# Patient Record
Sex: MALE | Race: WHITE | NOT HISPANIC OR LATINO | Employment: FULL TIME | URBAN - NONMETROPOLITAN AREA
[De-identification: names, ages, dates, MRNs, and addresses within clinical notes are randomized per-mention and may not be internally consistent; named-entity substitution may affect disease eponyms.]

---

## 2017-03-16 ENCOUNTER — OFFICE VISIT (OUTPATIENT)
Dept: FAMILY MEDICINE CLINIC | Facility: CLINIC | Age: 47
End: 2017-03-16

## 2017-03-16 VITALS
HEART RATE: 77 BPM | BODY MASS INDEX: 26.74 KG/M2 | WEIGHT: 191 LBS | DIASTOLIC BLOOD PRESSURE: 80 MMHG | SYSTOLIC BLOOD PRESSURE: 132 MMHG | HEIGHT: 71 IN | OXYGEN SATURATION: 98 %

## 2017-03-16 DIAGNOSIS — M79.601 PAIN, ARM, RIGHT: ICD-10-CM

## 2017-03-16 DIAGNOSIS — J02.9 ACUTE PHARYNGITIS, UNSPECIFIED ETIOLOGY: Primary | ICD-10-CM

## 2017-03-16 PROBLEM — E78.1 HYPERTRIGLYCERIDEMIA: Status: ACTIVE | Noted: 2017-03-16

## 2017-03-16 PROBLEM — I87.2 CHRONIC VENOUS INSUFFICIENCY: Status: ACTIVE | Noted: 2017-03-16

## 2017-03-16 PROBLEM — E11.9 TYPE 2 DIABETES MELLITUS WITHOUT COMPLICATION: Status: ACTIVE | Noted: 2017-03-16

## 2017-03-16 LAB
EXPIRATION DATE: NORMAL
INTERNAL CONTROL: NORMAL
Lab: NORMAL
S PYO AG THROAT QL: NEGATIVE

## 2017-03-16 PROCEDURE — 99214 OFFICE O/P EST MOD 30 MIN: CPT | Performed by: FAMILY MEDICINE

## 2017-03-16 PROCEDURE — 87880 STREP A ASSAY W/OPTIC: CPT | Performed by: FAMILY MEDICINE

## 2017-03-16 NOTE — PROGRESS NOTES
Subjective   Solomon Aguilar is a 46 y.o. male.     History of Present Illness   46-year-old male.  Here for 2 problems.  First, sore throat.  Started this morning.  No fever.  No cough.  No cold or drainage.  Second, right upper extremity pain.  He points to the anterior surface of his forearm.  He really does not state that his fingers are numb.  He is right handed.  No definite trauma but he does a lot of lifting and his right hand is his dominant hand.  The following portions of the patient's history were reviewed and updated as appropriate: allergies, current medications, past family history, past medical history, past social history, past surgical history and problem list.    Review of Systems   Constitutional: Negative for activity change, chills and fever.   HENT: Positive for sore throat. Negative for congestion.    Respiratory: Negative for cough and wheezing.    Cardiovascular: Negative for chest pain.   Musculoskeletal: Positive for arthralgias. Negative for neck pain (right upper extremity pain) and neck stiffness.       Objective   Physical Exam   Constitutional: He appears well-developed and well-nourished.   HENT:   Right Ear: External ear normal.   Left Ear: External ear normal.   Mouth/Throat: Posterior oropharyngeal edema present.   Eyes: EOM are normal. Pupils are equal, round, and reactive to light.   Neck: Neck supple.   Cardiovascular: Normal rate, regular rhythm and normal heart sounds.    Pulmonary/Chest: Effort normal and breath sounds normal.   Vitals reviewed.      Assessment/Plan   Solomon was seen today for shoulder pain, sore throat, headache and hyperglycemia.    Diagnoses and all orders for this visit:    Acute pharyngitis, unspecified etiology  -     POC Rapid Strep A    Pain, arm, right     non-strep nasal pharyngitis.  Symptomatic care.  If he runs a fever and develops a cough and aching this evening, we'll do a flu test in the morning.    Pain in the right arm.  He'll wear a  carpal splint at night.  If this gets worse may have to consider testing for carpal tunnel.  No x-rays indicated yet.

## 2017-06-02 RX ORDER — OMEPRAZOLE 40 MG/1
40 CAPSULE, DELAYED RELEASE ORAL DAILY
Qty: 90 CAPSULE | Refills: 3 | Status: SHIPPED | OUTPATIENT
Start: 2017-06-02

## 2017-08-02 ENCOUNTER — OFFICE VISIT (OUTPATIENT)
Dept: FAMILY MEDICINE CLINIC | Facility: CLINIC | Age: 47
End: 2017-08-02

## 2017-08-02 ENCOUNTER — TELEPHONE (OUTPATIENT)
Dept: FAMILY MEDICINE CLINIC | Facility: CLINIC | Age: 47
End: 2017-08-02

## 2017-08-02 VITALS
WEIGHT: 184 LBS | OXYGEN SATURATION: 98 % | HEART RATE: 76 BPM | SYSTOLIC BLOOD PRESSURE: 130 MMHG | DIASTOLIC BLOOD PRESSURE: 80 MMHG | BODY MASS INDEX: 25.76 KG/M2 | HEIGHT: 71 IN

## 2017-08-02 DIAGNOSIS — R20.2 PARESTHESIA OF LEFT ARM AND LEG: ICD-10-CM

## 2017-08-02 DIAGNOSIS — E11.9 TYPE 2 DIABETES MELLITUS WITHOUT COMPLICATION, WITHOUT LONG-TERM CURRENT USE OF INSULIN (HCC): Primary | ICD-10-CM

## 2017-08-02 DIAGNOSIS — R07.89 ATYPICAL CHEST PAIN: ICD-10-CM

## 2017-08-02 DIAGNOSIS — R63.4 WEIGHT LOSS: ICD-10-CM

## 2017-08-02 PROCEDURE — 93000 ELECTROCARDIOGRAM COMPLETE: CPT | Performed by: FAMILY MEDICINE

## 2017-08-02 PROCEDURE — 99214 OFFICE O/P EST MOD 30 MIN: CPT | Performed by: FAMILY MEDICINE

## 2017-08-02 RX ORDER — GLIMEPIRIDE 2 MG/1
2 TABLET ORAL
Qty: 30 TABLET | Refills: 1 | Status: SHIPPED | OUTPATIENT
Start: 2017-08-02

## 2017-08-02 NOTE — PROGRESS NOTES
Subjective   Solomon Aguilar is a 47 y.o. male.     History of Present Illness   47-year-old male who came today because his blood sugars of been running high.  I have not seen him in 2 years for general medical care.  States his blood sugars of been up in the 3 400s or even higher.  Since January he has lost 15 pounds.  An A1c was checked here in the office and was 12.  The following portions of the patient's history were reviewed and updated as appropriate: allergies, current medications, past family history, past medical history, past social history, past surgical history and problem list.    Review of Systems   Constitutional: Positive for unexpected weight change. Negative for chills and fever.   HENT: Negative for congestion.    Eyes: Negative.    Cardiovascular: Positive for chest pain (nonspecific left anterior chest pain which last for a few seconds.  Works every day.  Not necessarily exertion related.) and leg swelling (mild.  Peripheral venous disease.).   Gastrointestinal: Negative.    Endocrine: Positive for polydipsia and polyuria.   Genitourinary: Positive for frequency. Negative for dysuria.   Neurological: Positive for numbness (some sort of paresthesia in the left arm and left leg.  This is been present some time.).       Objective   Physical Exam   Constitutional: He is oriented to person, place, and time. He appears well-nourished.   HENT:   Head: Normocephalic.   Right Ear: External ear normal.   Left Ear: External ear normal.   Mouth/Throat: Oropharynx is clear and moist.   Eyes: EOM are normal.   Neck: Neck supple.   Cardiovascular: Normal rate, regular rhythm, normal heart sounds and intact distal pulses.    No murmur heard.  No carotid or abdominal bruits   Pulmonary/Chest: Effort normal and breath sounds normal.   Abdominal: Soft. He exhibits no distension. There is no tenderness.   Musculoskeletal: Normal range of motion.   Neurological: He is alert and oriented to person, place, and  time. No cranial nerve deficit. He exhibits normal muscle tone. Coordination normal.   Skin: Skin is warm. No erythema.   Psychiatric: He has a normal mood and affect.       ECG 12 Lead  Date/Time: 8/2/2017 10:17 AM  Performed by: SAMM ORO  Authorized by: SAMM ORO   Comparison: not compared with previous ECG   Rhythm: sinus rhythm  Rate: normal  Conduction: conduction normal  ST Segments: ST segments normal  T Waves: T waves normal  QRS axis: normal  Other: no other findings  Clinical impression: normal ECG          Assessment/Plan   Solomon was seen today for blood sugar problem.    Diagnoses and all orders for this visit:    Type 2 diabetes mellitus without complication, without long-term current use of insulin  -     Hemoglobin A1c  -     Ambulatory Referral to Nutrition Services  -     metFORMIN (GLUCOPHAGE) 500 MG tablet; Take 1 tablet by mouth 2 (Two) Times a Day With Meals.  -     glimepiride (AMARYL) 2 MG tablet; Take 1 tablet by mouth Every Morning Before Breakfast.    Weight loss    Atypical chest pain    Paresthesia of left arm and leg    Other orders  -     ECG 12 Lead      Diabetes.  Sree came in for a simple visit but his diabetes is obviously out of control.  Has not been seen for any kind of general medical care for 2 years.  Still works every day.  He has weight loss, polydipsia.  A1c is 12.  I took the time to do a general exam.  Overall nothing particularly revealing.  Having some atypical chest pain but a normal EKG.  I can't say with confidence that this is angina.  Also some nonspecific complaints of unusual sensations in his left hand and left leg.  Normal sensation to monofilament.  No carotid bruits.  Other testing such as a carotid Doppler may be needed later.  Order written to get a comprehensive medical profile, CBC, lipid panel, TSH.  He has to get these through [employer].  Follow-up appointment given.  Also given a sheet to write down his blood sugars fasting and before his  evening meal, 3 times a week.  Appointment to meet with a nutritionist with his wife.  Get an eye exam.    Weight loss.  Due to his diabetes out of control.    Atypical chest pain.  Normal EKG.    Paresthesia in the left arm and left leg.  Exam unremarkable.

## 2017-08-17 ENCOUNTER — DOCUMENTATION (OUTPATIENT)
Dept: FAMILY MEDICINE CLINIC | Facility: CLINIC | Age: 47
End: 2017-08-17

## 2017-08-17 ENCOUNTER — TELEPHONE (OUTPATIENT)
Dept: FAMILY MEDICINE CLINIC | Facility: CLINIC | Age: 47
End: 2017-08-17

## 2017-08-17 RX ORDER — FENOFIBRATE 145 MG/1
145 TABLET, COATED ORAL DAILY
Qty: 30 TABLET | Refills: 5 | Status: SHIPPED | OUTPATIENT
Start: 2017-08-17 | End: 2022-04-26 | Stop reason: ALTCHOICE

## 2017-08-17 RX ORDER — ATORVASTATIN CALCIUM 80 MG/1
80 TABLET, FILM COATED ORAL DAILY
Qty: 30 TABLET | Refills: 5 | Status: SHIPPED | OUTPATIENT
Start: 2017-08-17

## 2017-08-17 NOTE — PROGRESS NOTES
Solomon came by the office today with his wife to go over his medicines.  I saw his both his daughters at this time so he doesn't have an actual office visit.  He was recently found to have his diabetes will out of control and was started on medicines.  Apparently since I saw him last he went to the emergency room at [facility] where apparently he had a very extensive workup, primarily for some nonspecific chest pain.  My concern today, is his triglycerides 931.  I do feel strongly that he needs to be on a statin as well as a fenofibrate.  When I saw him last, he mention some paresthesia in his left arm and left leg.  Nothing was done at [facility] and I am ordering a carotid Doppler.  He has been checking blood sugars and states that his readings have been considerably better, that is less than 200 and more times less than 150.

## 2017-08-17 NOTE — TELEPHONE ENCOUNTER
----- Message from Juanis Mendoza sent at 8/10/2017  2:38 PM EDT -----  PATIENT CALLED REGARDING RECENT LABS RESULTS. ALSO HAD CONCERNS ABOUT SUGAR RUNNING HIGH; OVER 300    PLEASE CALL PATIENT [phone]

## 2022-04-19 DIAGNOSIS — Z00.6 EXAMINATION FOR NORMAL COMPARISON OR CONTROL IN CLINICAL RESEARCH: Primary | ICD-10-CM

## 2022-04-26 ENCOUNTER — NURSE NAVIGATOR (OUTPATIENT)
Dept: ONCOLOGY | Facility: CLINIC | Age: 52
End: 2022-04-26

## 2022-04-26 ENCOUNTER — OFFICE VISIT (OUTPATIENT)
Dept: PULMONOLOGY | Facility: CLINIC | Age: 52
End: 2022-04-26

## 2022-04-26 VITALS
WEIGHT: 188.8 LBS | HEART RATE: 59 BPM | DIASTOLIC BLOOD PRESSURE: 80 MMHG | OXYGEN SATURATION: 99 % | HEIGHT: 71 IN | BODY MASS INDEX: 26.43 KG/M2 | SYSTOLIC BLOOD PRESSURE: 140 MMHG | TEMPERATURE: 98.4 F

## 2022-04-26 DIAGNOSIS — R91.1 LUNG NODULE: Primary | ICD-10-CM

## 2022-04-26 DIAGNOSIS — R91.8 LUNG NODULES: Primary | ICD-10-CM

## 2022-04-26 PROCEDURE — 99204 OFFICE O/P NEW MOD 45 MIN: CPT | Performed by: INTERNAL MEDICINE

## 2022-04-26 RX ORDER — KETOCONAZOLE 20 MG/ML
1 SHAMPOO TOPICAL 3 TIMES WEEKLY
COMMUNITY
Start: 2022-04-08

## 2022-04-26 RX ORDER — FENOFIBRATE 160 MG/1
160 TABLET ORAL DAILY
COMMUNITY

## 2022-04-26 RX ORDER — ASPIRIN 81 MG/1
81 TABLET ORAL DAILY
COMMUNITY

## 2022-04-26 RX ORDER — EMPAGLIFLOZIN 10 MG/1
1 TABLET, FILM COATED ORAL DAILY
COMMUNITY
Start: 2022-02-25

## 2022-04-26 RX ORDER — LOSARTAN POTASSIUM 100 MG/1
100 TABLET ORAL DAILY
COMMUNITY
Start: 2022-04-11

## 2022-04-26 NOTE — PROGRESS NOTES
CC:    Lung nodule    HPI:    50 y/o WM w/ h/o ~30 py smoking quit 2012, vaping [id number], HTN, HLD, T2DM, GERD, who had an episode of hemoptysis about a month ago.  He awoke from sleep and coughed up some fresh blood and blood clot x1.  Did not persist after this.  He quit vaping about 2 weeks ago.  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  No wheezing, chest tightness, chronic cough etc.  This led to CXR and CT scan.    PMH:    No past medical history on file.  PSH:    Past Surgical History:   Procedure Laterality Date   • HERNIA REPAIR       FH:    No family history on file.  SH:    Social History     Socioeconomic History   • Marital status:    Tobacco Use   • Smoking status: Former Smoker     Types: Electronic Cigarette   Substance and Sexual Activity   • Alcohol use: No     ALLERGIES:    No Known Allergies  MEDICATIONS:      Current Outpatient Medications:   •  aspirin 81 MG EC tablet, Take 81 mg by mouth Daily., Disp: , Rfl:   •  atorvastatin (LIPITOR) 80 MG tablet, Take 1 tablet by mouth Daily., Disp: 30 tablet, Rfl: 5  •  fenofibrate 160 MG tablet, Take 160 mg by mouth Daily., Disp: , Rfl:   •  fluocinonide (LIDEX) 0.05 % cream, Apply 1 application topically to the appropriate area as directed As Needed., Disp: , Rfl:   •  glimepiride (AMARYL) 2 MG tablet, Take 1 tablet by mouth Every Morning Before Breakfast. (Patient taking differently: Take 4 mg by mouth Every Morning Before Breakfast.), Disp: 30 tablet, Rfl: 1  •  Jardiance 10 MG tablet tablet, Take 1 tablet by mouth Daily., Disp: , Rfl:   •  ketoconazole (NIZORAL) 2 % shampoo, Apply 1 application topically to the appropriate area as directed 3 (Three) Times a Week., Disp: , Rfl:   •  losartan (COZAAR) 100 MG tablet, Take 100 mg by mouth Daily., Disp: , Rfl:   •  metFORMIN (GLUCOPHAGE) 1000 MG tablet, Take 1,000 mg by mouth 2 (Two) Times a Day., Disp: , Rfl:   •  omeprazole (priLOSEC) 40 MG capsule, Take 1 capsule by mouth Daily., Disp: 90  capsule, Rfl: 3  ROS:  Per HPI, otherwise all systems reviewed and negative.    DIAGNOSTIC DATA (Reviewed and interpreted by me unless otherwise specified):    CT Chest 2/28/20 and 4/8/22 - partially calcified smooth nodule RLL sup segment roughly 10x15 mm, possible slight growth from 2020 but same overall size / shape / configuration and this is likely secondary to slice selection.  Stable ~4 mm RLL nodule.  I don't appreciate a 3 mm nodule in LLL mentioned by radiologist.  Partially calcified mediastinal LN's and calcifications in spleen suggestive of prior histo.    Vitals:    04/26/22 1504   BP: 140/80   Pulse: 59   Temp: 98.4 °F (36.9 °C)   SpO2: 99%       Physical Exam   Constitutional: Oriented to person, place, and time. Appears well-developed and well-nourished.   Head: Normocephalic and atraumatic.   Nose: Nose normal.   Mouth/Throat: Oropharynx is clear and moist.   Eyes: Conjunctivae are normal.  Pupils normal.  Neck: No tracheal deviation present.   Cardiovascular: Normal rate, regular rhythm, normal heart sounds and intact distal pulses.  Exam reveals no gallop and no friction rub.  No thrill.  No JVD.  No edema.  No murmur heard.  Pulmonary/Chest: Effort normal and breath sounds normal.  No tenderness to palpation.  No clubbing.   Abdominal: Soft. Bowel sounds are normal. No distension. No tenderness. There is no guarding.   Musculoskeletal: Normal range of motion.  No tenderness.  Lymphadenopathy:  No cervical adenopathy.   Neurological:  No new focal neurological deficits observed   Skin: Skin is warm and dry. No rash noted.   Psychiatric: Normal mood and affect.  Behavior is normal. Judgment normal.    Assessment/Plan     Lung Nodules  Prior Tobacco Abuse ~30 py quit 2012  Vaping Use 10 years quit 2022  Hemoptysis    Lung nodules appear stable over 2 year interval with slight growth in larger one vs slice selection.  Hemoptysis resolved.  Overall low risk appearance given stability over 2 years.   Hemoptysis could have been a posterior nasal bleed vs true hemoptysis.   Nonetheless was self limited.  Given smoking history and hemoptysis repeat CT in 6 months w/o contrast.  If stable can do annual screening for Lung Cancer until he has quit smoking > 15 years, I.e. through 2027.    RTC 6 months w/ CT Chest w/o contrast    Zachary Pace MD  Pulmonology and Critical Care Medicine  04/26/22 15:47 EDT  Electronically Signed    C.C.:  Angelique Trinidad MD, Daniel Tenorio MD

## 2022-04-29 NOTE — PROGRESS NOTES
Met patient and family member in lung nodule clinic with Dr. Pace. He has smoking history but quit x 10years ago and began vaping. He has stopped vaping the last 2 weeks after he experienced an episode of hemoptysis during the night with a blood clot. He had abnormal CXR and subsequent CT chest revealed 13mm RLL and 8mm RML nodules. These have not changed significantly since CT 02/2020. Scans reviewed. Per Dr. Pace repeat CT chest x6mo. He v/u and agreeable to plan. He was encouraged to stop vaping. Introduced lung navigator role and provided contact information. He knows to call with questions or concerns.

## 2022-09-16 ENCOUNTER — HOSPITAL ENCOUNTER (OUTPATIENT)
Dept: CT IMAGING | Facility: HOSPITAL | Age: 52
Discharge: HOME | End: 2022-09-16
Admitting: INTERNAL MEDICINE

## 2022-09-16 DIAGNOSIS — R91.8 LUNG NODULES: ICD-10-CM

## 2022-09-16 PROCEDURE — 71250 CT THORAX DX C-: CPT

## 2022-10-19 ENCOUNTER — NURSE NAVIGATOR (OUTPATIENT)
Dept: ONCOLOGY | Facility: CLINIC | Age: 52
End: 2022-10-19

## 2022-10-19 NOTE — PROGRESS NOTES
Wife called regarding follow-up plan s/p CT chest. [facility] were able to see results on [employer]. Will notify Dr. Pace for further decision making. She v/u and agreeable to plan.

## 2022-10-21 ENCOUNTER — NURSE NAVIGATOR (OUTPATIENT)
Dept: ONCOLOGY | Facility: CLINIC | Age: 52
End: 2022-10-21

## 2022-10-21 NOTE — PROGRESS NOTES
LVM for wife notifying her Dr. Pace would like to follow-up with them in office on a non-urgent bases. Notified office for scheduling. Provided return contact information for further questions or concerns.

## 2023-01-30 ENCOUNTER — OFFICE VISIT (OUTPATIENT)
Dept: PULMONOLOGY | Facility: CLINIC | Age: 53
End: 2023-01-30
Payer: COMMERCIAL

## 2023-01-30 VITALS
HEART RATE: 66 BPM | SYSTOLIC BLOOD PRESSURE: 122 MMHG | HEIGHT: 71 IN | WEIGHT: 192.6 LBS | BODY MASS INDEX: 26.96 KG/M2 | TEMPERATURE: 98 F | OXYGEN SATURATION: 99 % | DIASTOLIC BLOOD PRESSURE: 80 MMHG

## 2023-01-30 DIAGNOSIS — Z12.2 ENCOUNTER FOR SCREENING FOR LUNG CANCER: ICD-10-CM

## 2023-01-30 DIAGNOSIS — Z87.891 HISTORY OF NICOTINE DEPENDENCE: ICD-10-CM

## 2023-01-30 PROCEDURE — 99213 OFFICE O/P EST LOW 20 MIN: CPT | Performed by: INTERNAL MEDICINE

## 2023-01-30 NOTE — PROGRESS NOTES
CC:    Lung nodule    HPI:    52 y/o WM w/ h/o ~30 py smoking quit , vaping [id number], HTN, HLD, T2DM, GERD, who had an episode of hemoptysis about a month ago.  He awoke from sleep and coughed up some fresh blood and blood clot x1.  Did not persist after this.  He quit vaping about 2 weeks ago.  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  No wheezing, chest tightness, chronic cough etc.  This led to CXR and CT scan.    INTERVAL HISTORY:    52 y.o. patient returns for follow up.  He resumed vaping, but not smoking.  No further hemoptysis since last visit.    PMH:    No past medical history on file.  PSH:    Past Surgical History:   Procedure Laterality Date   • HERNIA REPAIR       FH:    No family history on file.  SH:    Social History     Socioeconomic History   • Marital status:    Tobacco Use   • Smoking status: Every Day     Packs/day: 0.50     Years: 17.00     Pack years: 8.50     Types: Electronic Cigarette, Cigarettes     Last attempt to quit:      Years since quittin.0   Substance and Sexual Activity   • Alcohol use: No   • Drug use: Never   • Sexual activity: Defer     ALLERGIES:    No Known Allergies  MEDICATIONS:      Current Outpatient Medications:   •  aspirin 81 MG EC tablet, Take 81 mg by mouth Daily., Disp: , Rfl:   •  atorvastatin (LIPITOR) 80 MG tablet, Take 1 tablet by mouth Daily., Disp: 30 tablet, Rfl: 5  •  fenofibrate 160 MG tablet, Take 160 mg by mouth Daily., Disp: , Rfl:   •  fluocinonide (LIDEX) 0.05 % cream, Apply 1 application topically to the appropriate area as directed As Needed., Disp: , Rfl:   •  glimepiride (AMARYL) 2 MG tablet, Take 1 tablet by mouth Every Morning Before Breakfast. (Patient taking differently: Take 4 mg by mouth Every Morning Before Breakfast.), Disp: 30 tablet, Rfl: 1  •  Jardiance 10 MG tablet tablet, Take 1 tablet by mouth Daily., Disp: , Rfl:   •  ketoconazole (NIZORAL) 2 % shampoo, Apply 1 application topically to the appropriate area as  directed 3 (Three) Times a Week., Disp: , Rfl:   •  losartan (COZAAR) 100 MG tablet, Take 100 mg by mouth Daily., Disp: , Rfl:   •  metFORMIN (GLUCOPHAGE) 1000 MG tablet, Take 1,000 mg by mouth 2 (Two) Times a Day., Disp: , Rfl:   •  omeprazole (priLOSEC) 40 MG capsule, Take 1 capsule by mouth Daily., Disp: 90 capsule, Rfl: 3  ROS:  Per HPI, otherwise all systems reviewed and negative.    DIAGNOSTIC DATA (Reviewed and interpreted by me unless otherwise specified):    CT Chest 2/28/20 and 4/8/22 - partially calcified smooth nodule RLL sup segment roughly 10x15 mm, possible slight growth from 2020 but same overall size / shape / configuration and this is likely secondary to slice selection.  Stable ~4 mm RLL nodule.  I don't appreciate a 3 mm nodule in LLL mentioned by radiologist.  Partially calcified mediastinal LN's and calcifications in spleen suggestive of prior histo.    CT Chest 9/16/22 - overall right partially calcified perihilar nodule sup segment RLL stable from 2/28/20, other scattered granulomas stable, no other significant change    Vitals:    01/30/23 1521   BP: 122/80   Pulse: 66   Temp: 98 °F (36.7 °C)   SpO2: 99%       Physical Exam:    Constitutional: Alert, no Distress  Mouth/Throat: Oropharynx is clear and moist.   Cardiovascular: Normal rate, regular rhythm, normal heart sounds.   Pulmonary/Chest: Effort normal and breath sounds normal.  No clubbing.       Assessment & Plan     Lung Nodules  Prior Tobacco Abuse ~30 py quit 2012  Vaping Use > 10 years - ongoing  Hemoptysis    Lung nodules appear stable from 2/2020 to 9/2022.  Hemoptysis resolved.  Overall low risk appearance given stability over 2.5 years.  Hemoptysis could have been a posterior nasal bleed vs true hemoptysis.   Nonetheless was self limited and has since resolved.    Can go to annual screening at this point - he is agreeable.  Would qualify through 2027.    RTC September 2023 w/ LDCT and Full PFT    Zachary Pace  MD  Pulmonology and Critical Care Medicine  01/30/23 15:36 EST  Electronically Signed    C.C.:  No ref. provider found, Angelique Trinidad MD

## 2023-09-13 ENCOUNTER — HOSPITAL ENCOUNTER (OUTPATIENT)
Dept: CT IMAGING | Facility: HOSPITAL | Age: 53
Discharge: HOME | End: 2023-09-13
Admitting: INTERNAL MEDICINE
Payer: COMMERCIAL

## 2023-09-13 DIAGNOSIS — Z87.891 HISTORY OF NICOTINE DEPENDENCE: ICD-10-CM

## 2023-09-13 PROCEDURE — 71271 CT THORAX LUNG CANCER SCR C-: CPT

## 2023-10-16 ENCOUNTER — OFFICE VISIT (OUTPATIENT)
Dept: PULMONOLOGY | Facility: CLINIC | Age: 53
End: 2023-10-16
Payer: COMMERCIAL

## 2023-10-16 VITALS
HEART RATE: 53 BPM | TEMPERATURE: 97.5 F | RESPIRATION RATE: 16 BRPM | SYSTOLIC BLOOD PRESSURE: 120 MMHG | BODY MASS INDEX: 25.48 KG/M2 | WEIGHT: 182 LBS | HEIGHT: 71 IN | OXYGEN SATURATION: 99 % | DIASTOLIC BLOOD PRESSURE: 70 MMHG

## 2023-10-16 DIAGNOSIS — Z87.891 PERSONAL HISTORY OF SMOKING: ICD-10-CM

## 2023-10-16 DIAGNOSIS — R91.8 LUNG NODULES: Primary | ICD-10-CM

## 2023-10-16 RX ORDER — GABAPENTIN 100 MG/1
100 CAPSULE ORAL EVERY MORNING
COMMUNITY
Start: 2023-08-24

## 2023-10-16 RX ORDER — GLIMEPIRIDE 4 MG/1
4 TABLET ORAL DAILY
COMMUNITY
Start: 2023-08-25

## 2023-10-16 RX ORDER — EMPAGLIFLOZIN 25 MG/1
25 TABLET, FILM COATED ORAL DAILY
COMMUNITY
Start: 2023-09-12

## 2023-10-16 NOTE — PROGRESS NOTES
"Follow Up Office Note       Patient Name: Solomon Aguilar    Referring Physician: No ref. provider found    Chief Complaint:    Chief Complaint   Patient presents with    Post CT     F/u       History of Present Illness: Solomon Aguilar is a 53 y.o. male who is here today to follow-up care with Pulmonary.  Patient has a past medical history significant for tobacco abuse, hypertension, hyperlipidemia, diabetes mellitus type 2, and GERD.  He has a history of lung nodules and previously followed with a partner to Zachary Pace.  He is here today for follow-up.  Patient currently denies any pulmonary complaints.    Review of Systems:   Review of Systems   Constitutional:  Negative for chills, fatigue and fever.   HENT:  Negative for congestion and voice change.    Eyes:  Negative for blurred vision.   Respiratory:  Negative for cough, shortness of breath and wheezing.    Cardiovascular:  Negative for chest pain.   Skin:  Negative for dry skin.   Hematological:  Negative for adenopathy.   Psychiatric/Behavioral:  Negative for agitation and depressed mood.        The following portions of the patient's history were reviewed and updated as appropriate: allergies, current medications, past family history, past medical history, past social history, past surgical history and problem list.    Physical Exam:  Vital Signs:   Vitals:    10/16/23 1226   BP: 120/70   BP Location: Right arm   Patient Position: Sitting   Cuff Size: Adult   Pulse: 53   Resp: 16   Temp: 97.5 °F (36.4 °C)   SpO2: 99%  Comment: room air at rest   Weight: 82.6 kg (182 lb)   Height: 180.8 cm (71.18\")       Physical Exam  Vitals and nursing note reviewed.   Constitutional:       General: He is not in acute distress.     Appearance: He is well-developed and normal weight. He is not ill-appearing or toxic-appearing.   Cardiovascular:      Rate and Rhythm: Normal rate and regular rhythm.      Pulses: Normal pulses.      Heart sounds: Normal heart " sounds. No murmur heard.     No gallop.   Pulmonary:      Effort: Pulmonary effort is normal.      Breath sounds: Normal breath sounds. No wheezing, rhonchi or rales.   Musculoskeletal:      Right lower leg: No edema.      Left lower leg: No edema.   Neurological:      Mental Status: He is alert.         Immunization History   Administered Date(s) Administered    Flublok 18+yrs 11/13/2018       Results Review:   - I personally reviewed the pts imaging from CT scan of the chest from 9/13/2023 showed that the right lower lobe nodule stable at 14 x 13 mm, there are a few scattered nodules in the lower lobes on the right side and left side all of them less than 4 mm though.      Assessment / Plan:   Diagnoses and all orders for this visit:    1. Lung nodules (Primary)  2. Personal history of smoking  -All of his lung nodules are currently stable.  Particularly the right lower lobe on his largest has calcification stable now since 2020 no further imaging is required for this.  With regards to his lung cancer screening.  He is a smoker but does not quite meet the packs per day history in addition to that only does vaping at this point.  I told him that there is no clear guidance right now on how to quantify the amount of vaping that is being performed with regards to lung cancer, but we could consider doing a repeat CT scan in 2 years.  Hopefully by then will have some more information he verbalized understanding agree with the plan.  I will see him back in 2 years.      Follow Up:   Return in about 2 years (around 10/16/2025).       GERARDO Lara, DO  Pulmonary and Critical Care Medicine  Note Electronically Signed    Part of this note may be an electronic transcription/translation of spoken language to printed text using the Dragon Dictation System.